# Patient Record
Sex: FEMALE | Race: OTHER | HISPANIC OR LATINO | URBAN - METROPOLITAN AREA
[De-identification: names, ages, dates, MRNs, and addresses within clinical notes are randomized per-mention and may not be internally consistent; named-entity substitution may affect disease eponyms.]

---

## 2023-03-09 ENCOUNTER — OFFICE (OUTPATIENT)
Dept: URBAN - METROPOLITAN AREA CLINIC 79 | Facility: CLINIC | Age: 53
End: 2023-03-09

## 2023-03-09 VITALS
SYSTOLIC BLOOD PRESSURE: 137 MMHG | HEIGHT: 63 IN | DIASTOLIC BLOOD PRESSURE: 80 MMHG | HEART RATE: 73 BPM | TEMPERATURE: 97.7 F | WEIGHT: 136 LBS

## 2023-03-09 DIAGNOSIS — R14.0 ABDOMINAL DISTENSION (GASEOUS): ICD-10-CM

## 2023-03-09 DIAGNOSIS — Z12.11 ENCOUNTER FOR SCREENING FOR MALIGNANT NEOPLASM OF COLON: ICD-10-CM

## 2023-03-09 DIAGNOSIS — Z79.899 OTHER LONG TERM (CURRENT) DRUG THERAPY: ICD-10-CM

## 2023-03-09 DIAGNOSIS — R10.13 EPIGASTRIC PAIN: ICD-10-CM

## 2023-03-09 DIAGNOSIS — R12 HEARTBURN: ICD-10-CM

## 2023-03-09 DIAGNOSIS — R19.6 HALITOSIS: ICD-10-CM

## 2023-03-09 PROCEDURE — 99204 OFFICE O/P NEW MOD 45 MIN: CPT | Performed by: PHYSICIAN ASSISTANT

## 2023-03-09 NOTE — SERVICEHPINOTES
ARIELA ART   is a   52   year old    yo white  female who is here for evaluation      concerning stomach pain, halitosis, reflux that has been going on for approx. 6 months. She has halitosis, belching, "sour taste," and heartburn: No dysphagia or odynophagia. She eats dinner by 6 pm Sleeps by 10 pm. She has also elevated head of the bed. She has tried Nexium 20 mg qd used as instructed x 2 months with no resolution of her symptoms. No NSAID use. No ETOH/tobacco use. No known cardiac or pulmonary disease. No fm h/o gastrointestinal disorders. Denies fevers, chest pain, n/v, dysphagia, regurgitation, early satiety, decrease in appetite. abdominal pain, melena, weight loss. 
br

brNo prior CRC screening colonoscopy br No fm h/o CRC or colonic polyps br
She has BMs 1-2x/day, BSS type 3-4: No blood in stool or BRBPR. br       
br
br52 yo WF who is here for evaluation concerning stomach pain, halitosis, reflux that has been going on for approx. 6 months. She has halitosis, belching, "sour taste," and heartburn: No dysphagia or odynophagia. She eats dinner by 6 pm Sleeps by 10 pm. She has also elevated head of the bed. She has tried Nexium 20 mg qd used as instructed x 2 months with no resolution of her symptoms. No NSAID use. No ETOH/tobacco use. No known cardiac or pulmonary disease. No fm h/o gastrointestinal disorders. Denies fevers, chest pain, n/v, dysphagia, regurgitation, early satiety, decrease in appetite. abdominal pain, melena, weight loss. No prior CRC screening colonoscopy  No fm h/o CRC or colonic polyps brShe has BMs 1-2x/day, BSS type 3-4: No blood in stool or BRBPR.

## 2023-06-02 ENCOUNTER — OFFICE (OUTPATIENT)
Dept: URBAN - METROPOLITAN AREA CLINIC 30 | Facility: CLINIC | Age: 53
End: 2023-06-02

## 2023-06-02 ENCOUNTER — OFFICE (OUTPATIENT)
Dept: URBAN - METROPOLITAN AREA PATHOLOGY 18 | Facility: PATHOLOGY | Age: 53
End: 2023-06-02

## 2023-06-02 VITALS
SYSTOLIC BLOOD PRESSURE: 125 MMHG | DIASTOLIC BLOOD PRESSURE: 56 MMHG | TEMPERATURE: 97.3 F | OXYGEN SATURATION: 99 % | RESPIRATION RATE: 15 BRPM | HEART RATE: 90 BPM | HEART RATE: 82 BPM | DIASTOLIC BLOOD PRESSURE: 81 MMHG | RESPIRATION RATE: 16 BRPM | HEART RATE: 87 BPM | DIASTOLIC BLOOD PRESSURE: 87 MMHG | SYSTOLIC BLOOD PRESSURE: 132 MMHG | HEART RATE: 80 BPM | TEMPERATURE: 97.8 F | SYSTOLIC BLOOD PRESSURE: 134 MMHG | DIASTOLIC BLOOD PRESSURE: 84 MMHG | WEIGHT: 138 LBS | RESPIRATION RATE: 12 BRPM | OXYGEN SATURATION: 98 % | HEIGHT: 63 IN

## 2023-06-02 DIAGNOSIS — R19.6 HALITOSIS: ICD-10-CM

## 2023-06-02 DIAGNOSIS — K29.50 UNSPECIFIED CHRONIC GASTRITIS WITHOUT BLEEDING: ICD-10-CM

## 2023-06-02 DIAGNOSIS — K26.9 DUODENAL ULCER, UNSPECIFIED AS ACUTE OR CHRONIC, WITHOUT HEM: ICD-10-CM

## 2023-06-02 DIAGNOSIS — R10.13 EPIGASTRIC PAIN: ICD-10-CM

## 2023-06-02 DIAGNOSIS — R14.0 ABDOMINAL DISTENSION (GASEOUS): ICD-10-CM

## 2023-06-02 DIAGNOSIS — B96.81 HELICOBACTER PYLORI [H. PYLORI] AS THE CAUSE OF DISEASES CLA: ICD-10-CM

## 2023-06-02 DIAGNOSIS — R12 HEARTBURN: ICD-10-CM

## 2023-06-02 PROBLEM — K31.89 OTHER DISEASES OF STOMACH AND DUODENUM: Status: ACTIVE | Noted: 2023-06-02

## 2023-06-02 PROCEDURE — 88305 TISSUE EXAM BY PATHOLOGIST: CPT | Performed by: PATHOLOGY

## 2023-06-02 PROCEDURE — 88342 IMHCHEM/IMCYTCHM 1ST ANTB: CPT | Performed by: PATHOLOGY

## 2023-06-02 PROCEDURE — 88313 SPECIAL STAINS GROUP 2: CPT | Performed by: PATHOLOGY
